# Patient Record
Sex: FEMALE | Race: WHITE | NOT HISPANIC OR LATINO | URBAN - METROPOLITAN AREA
[De-identification: names, ages, dates, MRNs, and addresses within clinical notes are randomized per-mention and may not be internally consistent; named-entity substitution may affect disease eponyms.]

---

## 2023-01-30 ENCOUNTER — EMERGENCY (EMERGENCY)
Facility: HOSPITAL | Age: 63
LOS: 1 days | Discharge: HOME | End: 2023-01-30
Attending: EMERGENCY MEDICINE
Payer: COMMERCIAL

## 2023-01-30 VITALS
TEMPERATURE: 98 F | OXYGEN SATURATION: 99 % | HEART RATE: 73 BPM | RESPIRATION RATE: 20 BRPM | SYSTOLIC BLOOD PRESSURE: 185 MMHG | WEIGHT: 132.06 LBS | HEIGHT: 62 IN | DIASTOLIC BLOOD PRESSURE: 88 MMHG

## 2023-01-30 LAB
ALBUMIN SERPL ELPH-MCNC: 4.1 G/DL — SIGNIFICANT CHANGE UP (ref 3.3–5.2)
ALP SERPL-CCNC: 81 U/L — SIGNIFICANT CHANGE UP (ref 40–120)
ALT FLD-CCNC: 19 U/L — SIGNIFICANT CHANGE UP
ANION GAP SERPL CALC-SCNC: 12 MMOL/L — SIGNIFICANT CHANGE UP (ref 5–17)
AST SERPL-CCNC: 22 U/L — SIGNIFICANT CHANGE UP
BASOPHILS # BLD AUTO: 0.07 K/UL — SIGNIFICANT CHANGE UP (ref 0–0.2)
BASOPHILS NFR BLD AUTO: 0.8 % — SIGNIFICANT CHANGE UP (ref 0–2)
BILIRUB SERPL-MCNC: 0.5 MG/DL — SIGNIFICANT CHANGE UP (ref 0.4–2)
BUN SERPL-MCNC: 19.7 MG/DL — SIGNIFICANT CHANGE UP (ref 8–20)
CALCIUM SERPL-MCNC: 9.3 MG/DL — SIGNIFICANT CHANGE UP (ref 8.4–10.5)
CHLORIDE SERPL-SCNC: 101 MMOL/L — SIGNIFICANT CHANGE UP (ref 96–108)
CO2 SERPL-SCNC: 23 MMOL/L — SIGNIFICANT CHANGE UP (ref 22–29)
CREAT SERPL-MCNC: 0.78 MG/DL — SIGNIFICANT CHANGE UP (ref 0.5–1.3)
EGFR: 85 ML/MIN/1.73M2 — SIGNIFICANT CHANGE UP
EOSINOPHIL # BLD AUTO: 0.12 K/UL — SIGNIFICANT CHANGE UP (ref 0–0.5)
EOSINOPHIL NFR BLD AUTO: 1.4 % — SIGNIFICANT CHANGE UP (ref 0–6)
GLUCOSE SERPL-MCNC: 121 MG/DL — HIGH (ref 70–99)
HCT VFR BLD CALC: 41.6 % — SIGNIFICANT CHANGE UP (ref 34.5–45)
HGB BLD-MCNC: 13.5 G/DL — SIGNIFICANT CHANGE UP (ref 11.5–15.5)
IMM GRANULOCYTES NFR BLD AUTO: 0.4 % — SIGNIFICANT CHANGE UP (ref 0–0.9)
LIDOCAIN IGE QN: 69 U/L — HIGH (ref 22–51)
LYMPHOCYTES # BLD AUTO: 2.44 K/UL — SIGNIFICANT CHANGE UP (ref 1–3.3)
LYMPHOCYTES # BLD AUTO: 29 % — SIGNIFICANT CHANGE UP (ref 13–44)
MCHC RBC-ENTMCNC: 28.7 PG — SIGNIFICANT CHANGE UP (ref 27–34)
MCHC RBC-ENTMCNC: 32.5 GM/DL — SIGNIFICANT CHANGE UP (ref 32–36)
MCV RBC AUTO: 88.3 FL — SIGNIFICANT CHANGE UP (ref 80–100)
MONOCYTES # BLD AUTO: 0.44 K/UL — SIGNIFICANT CHANGE UP (ref 0–0.9)
MONOCYTES NFR BLD AUTO: 5.2 % — SIGNIFICANT CHANGE UP (ref 2–14)
NEUTROPHILS # BLD AUTO: 5.3 K/UL — SIGNIFICANT CHANGE UP (ref 1.8–7.4)
NEUTROPHILS NFR BLD AUTO: 63.2 % — SIGNIFICANT CHANGE UP (ref 43–77)
PLATELET # BLD AUTO: 358 K/UL — SIGNIFICANT CHANGE UP (ref 150–400)
POTASSIUM SERPL-MCNC: 4.2 MMOL/L — SIGNIFICANT CHANGE UP (ref 3.5–5.3)
POTASSIUM SERPL-SCNC: 4.2 MMOL/L — SIGNIFICANT CHANGE UP (ref 3.5–5.3)
PROT SERPL-MCNC: 7.7 G/DL — SIGNIFICANT CHANGE UP (ref 6.6–8.7)
RBC # BLD: 4.71 M/UL — SIGNIFICANT CHANGE UP (ref 3.8–5.2)
RBC # FLD: 15.3 % — HIGH (ref 10.3–14.5)
SODIUM SERPL-SCNC: 136 MMOL/L — SIGNIFICANT CHANGE UP (ref 135–145)
WBC # BLD: 8.4 K/UL — SIGNIFICANT CHANGE UP (ref 3.8–10.5)
WBC # FLD AUTO: 8.4 K/UL — SIGNIFICANT CHANGE UP (ref 3.8–10.5)

## 2023-01-30 PROCEDURE — G1004: CPT

## 2023-01-30 PROCEDURE — 80053 COMPREHEN METABOLIC PANEL: CPT

## 2023-01-30 PROCEDURE — 96375 TX/PRO/DX INJ NEW DRUG ADDON: CPT

## 2023-01-30 PROCEDURE — 83690 ASSAY OF LIPASE: CPT

## 2023-01-30 PROCEDURE — 99285 EMERGENCY DEPT VISIT HI MDM: CPT

## 2023-01-30 PROCEDURE — 74177 CT ABD & PELVIS W/CONTRAST: CPT | Mod: 26,MG

## 2023-01-30 PROCEDURE — 96374 THER/PROPH/DIAG INJ IV PUSH: CPT | Mod: XU

## 2023-01-30 PROCEDURE — 96361 HYDRATE IV INFUSION ADD-ON: CPT

## 2023-01-30 PROCEDURE — 36415 COLL VENOUS BLD VENIPUNCTURE: CPT

## 2023-01-30 PROCEDURE — 99284 EMERGENCY DEPT VISIT MOD MDM: CPT | Mod: 25

## 2023-01-30 PROCEDURE — 74177 CT ABD & PELVIS W/CONTRAST: CPT | Mod: MG

## 2023-01-30 PROCEDURE — 85025 COMPLETE CBC W/AUTO DIFF WBC: CPT

## 2023-01-30 RX ORDER — SODIUM CHLORIDE 9 MG/ML
1000 INJECTION INTRAMUSCULAR; INTRAVENOUS; SUBCUTANEOUS ONCE
Refills: 0 | Status: COMPLETED | OUTPATIENT
Start: 2023-01-30 | End: 2023-01-30

## 2023-01-30 RX ORDER — MORPHINE SULFATE 50 MG/1
4 CAPSULE, EXTENDED RELEASE ORAL ONCE
Refills: 0 | Status: DISCONTINUED | OUTPATIENT
Start: 2023-01-30 | End: 2023-01-30

## 2023-01-30 RX ORDER — ONDANSETRON 8 MG/1
4 TABLET, FILM COATED ORAL ONCE
Refills: 0 | Status: COMPLETED | OUTPATIENT
Start: 2023-01-30 | End: 2023-01-30

## 2023-01-30 RX ORDER — OXYCODONE AND ACETAMINOPHEN 5; 325 MG/1; MG/1
1 TABLET ORAL
Qty: 9 | Refills: 0
Start: 2023-01-30 | End: 2023-02-01

## 2023-01-30 RX ADMIN — SODIUM CHLORIDE 1000 MILLILITER(S): 9 INJECTION INTRAMUSCULAR; INTRAVENOUS; SUBCUTANEOUS at 14:21

## 2023-01-30 RX ADMIN — MORPHINE SULFATE 4 MILLIGRAM(S): 50 CAPSULE, EXTENDED RELEASE ORAL at 15:48

## 2023-01-30 RX ADMIN — MORPHINE SULFATE 4 MILLIGRAM(S): 50 CAPSULE, EXTENDED RELEASE ORAL at 14:26

## 2023-01-30 RX ADMIN — SODIUM CHLORIDE 1000 MILLILITER(S): 9 INJECTION INTRAMUSCULAR; INTRAVENOUS; SUBCUTANEOUS at 15:48

## 2023-01-30 RX ADMIN — ONDANSETRON 4 MILLIGRAM(S): 8 TABLET, FILM COATED ORAL at 14:26

## 2023-01-30 NOTE — ED STATDOCS - PHYSICAL EXAMINATION
Gen: No acute distress, non toxic  HEENT: Mucous membranes moist, pink conjunctivae, EOMI  CV: RRR, nl s1/s2.  Resp: CTAB, normal rate and effort  GI: Abdomen soft, ND. No rebound, + RLQ tenderness with voluntary guarding  : No CVAT  Neuro: A&O x 3, moving all 4 extremities  MSK: No spine or joint tenderness to palpation  Skin: No rashes. intact and perfused.

## 2023-01-30 NOTE — ED STATDOCS - OBJECTIVE STATEMENT
61 y/o female with PMHx of  presents to the ED c/o 2 of RLQ pain w/o any associated symptoms, pain no improving with Motrin, no urinary symptoms, no fever, no blood in stool. no other complaints

## 2023-01-30 NOTE — ED STATDOCS - PATIENT PORTAL LINK FT
You can access the FollowMyHealth Patient Portal offered by [facility] by registering at the following website: [email or web address]. By joining [facility]’s FollowMyHealth portal, you will also be able to view your health information using other applications (apps) compatible with our system.

## 2023-01-30 NOTE — ED STATDOCS - NS ED ATTENDING STATEMENT MOD
This was a shared visit with the DIONNE. I reviewed and verified the documentation and independently performed the documented:

## 2023-01-30 NOTE — ED STATDOCS - CARE PROVIDER_API CALL
Carlton Noel)  Gastroenterology; Internal Medicine  [address] [place] [address]  Phone: [phone]  Fax: [phone]  Follow Up Time:

## 2023-01-30 NOTE — ED STATDOCS - NSFOLLOWUPINSTRUCTIONS_ED_ALL_ED_FT
Abdominal Pain    Many things can cause abdominal pain. Many times, abdominal pain is not caused by a disease and will improve without treatment. Your health care provider will do a physical exam to determine if there is a dangerous cause of your pain; blood tests and imaging may help determine the cause of your pain. However, in many cases, no cause may be found and you may need further testing as an outpatient. Monitor your abdominal pain for any changes.     SEEK IMMEDIATE MEDICAL CARE IF YOU HAVE ANY OF THE FOLLOWING SYMPTOMS: worsening abdominal pain, uncontrollable vomiting, profuse diarrhea, inability to have bowel movements or pass gas, black or bloody stools, fever accompanying chest pain or back pain, or fainting. These symptoms may represent a serious problem that is an emergency. Do not wait to see if the symptoms will go away. Get medical help right away. Call 911 and do not drive yourself to the hospital.       Please follow up with your doctor within 24 hours  Please follow up with gastroenterolgy within one week

## 2023-01-30 NOTE — ED STATDOCS - ATTENDING APP SHARED VISIT CONTRIBUTION OF CARE
Kareen: I performed a face to face bedside interview with patient regarding history of present illness, review of symptoms and past medical history. I completed an independent physical exam and ordered tests/medications as needed.  I have discussed patient's plan of care with advanced care provider. The advanced care provider assisted in  executing the discussed plan. I was available for any questions or issues that may have arose during the execution of the plan of care.

## 2023-01-30 NOTE — ED STATDOCS - NS ED ROS FT
ROS: No fever/chills. No eye pain/changes in vision, No ear pain/sore throat/dysphagia, No chest pain/palpitations. No SOB/cough/. + abdominal pain, no N/V/D, no black/bloody bm. No dysuria/frequency/discharge, No headache. No Dizziness.    No rashes or breaks in skin. No numbness/tingling/weakness.

## 2023-01-30 NOTE — ED STATDOCS - CLINICAL SUMMARY MEDICAL DECISION MAKING FREE TEXT BOX
63 y/o female with PMHx of  presents to the ED c/o RLQ pain for 2 days with voluntary guarding and some tenderness will check labs, treat symptoms, obtain CT to evaluate for appendicitis vs ovarian pathology

## 2024-01-18 ENCOUNTER — OFFICE (OUTPATIENT)
Dept: URBAN - METROPOLITAN AREA CLINIC 94 | Facility: CLINIC | Age: 64
Setting detail: OPHTHALMOLOGY
End: 2024-01-18
Payer: COMMERCIAL

## 2024-01-18 DIAGNOSIS — H25.13: ICD-10-CM

## 2024-01-18 DIAGNOSIS — H35.033: ICD-10-CM

## 2024-01-18 DIAGNOSIS — E11.9: ICD-10-CM

## 2024-01-18 DIAGNOSIS — H16.223: ICD-10-CM

## 2024-01-18 PROBLEM — E13.36 DIABETES MELLITUS W/OPHTH MANIFESTATIONS-TYPE 2 ; BOTH EYES: Status: ACTIVE | Noted: 2024-01-18

## 2024-01-18 PROBLEM — H16.222 DRY EYE SYNDROME K SICCA; RIGHT EYE, LEFT EYE, BOTH EYES: Status: ACTIVE | Noted: 2024-01-18

## 2024-01-18 PROBLEM — H16.221 DRY EYE SYNDROME K SICCA; RIGHT EYE, LEFT EYE, BOTH EYES: Status: ACTIVE | Noted: 2024-01-18

## 2024-01-18 PROCEDURE — 92250 FUNDUS PHOTOGRAPHY W/I&R: CPT | Performed by: OPHTHALMOLOGY

## 2024-01-18 PROCEDURE — 99204 OFFICE O/P NEW MOD 45 MIN: CPT | Performed by: OPHTHALMOLOGY

## 2024-01-18 ASSESSMENT — REFRACTION_MANIFEST
OD_VA1: 20/20
OS_VA1: 20/20
OS_AXIS: 126
OS_CYLINDER: -0.25
OD_SPHERE: +2.50
OS_SPHERE: +2.50

## 2024-01-18 ASSESSMENT — REFRACTION_CURRENTRX
OS_SPHERE: +2.50
OD_OVR_VA: 20/
OS_OVR_VA: 20/
OD_AXIS: 173
OS_AXIS: 125
OD_SPHERE: +2.25
OD_CYLINDER: -0.25
OS_CYLINDER: -0.50

## 2024-01-18 ASSESSMENT — REFRACTION_AUTOREFRACTION
OS_CYLINDER: -0.50
OS_SPHERE: +1.50
OD_SPHERE: +1.00
OD_CYLINDER: -0.50
OD_AXIS: 065
OS_AXIS: 085

## 2024-01-18 ASSESSMENT — SPHEQUIV_DERIVED
OD_SPHEQUIV: 0.75
OS_SPHEQUIV: 2.375
OS_SPHEQUIV: 1.25

## 2024-01-18 ASSESSMENT — SUPERFICIAL PUNCTATE KERATITIS (SPK)
OS_SPK: 1+
OD_SPK: 1+

## 2024-01-18 ASSESSMENT — CONFRONTATIONAL VISUAL FIELD TEST (CVF)
OS_FINDINGS: FULL
OD_FINDINGS: FULL

## 2024-02-23 ENCOUNTER — OFFICE (OUTPATIENT)
Dept: URBAN - METROPOLITAN AREA CLINIC 94 | Facility: CLINIC | Age: 64
Setting detail: OPHTHALMOLOGY
End: 2024-02-23
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
## 2024-02-23 PROCEDURE — NO SHOW FE NO SHOW FEE: Performed by: OPHTHALMOLOGY

## 2025-07-05 ENCOUNTER — INPATIENT (INPATIENT)
Facility: HOSPITAL | Age: 65
LOS: 0 days | Discharge: HOME | DRG: 84 | End: 2025-07-06
Attending: STUDENT IN AN ORGANIZED HEALTH CARE EDUCATION/TRAINING PROGRAM | Admitting: STUDENT IN AN ORGANIZED HEALTH CARE EDUCATION/TRAINING PROGRAM
Payer: MEDICARE

## 2025-07-05 VITALS
RESPIRATION RATE: 18 BRPM | WEIGHT: 126.99 LBS | OXYGEN SATURATION: 97 % | TEMPERATURE: 98 F | SYSTOLIC BLOOD PRESSURE: 181 MMHG | HEIGHT: 62 IN | HEART RATE: 88 BPM | DIASTOLIC BLOOD PRESSURE: 101 MMHG

## 2025-07-05 PROCEDURE — 70450 CT HEAD/BRAIN W/O DYE: CPT | Mod: 26

## 2025-07-05 PROCEDURE — 99291 CRITICAL CARE FIRST HOUR: CPT

## 2025-07-05 PROCEDURE — 72125 CT NECK SPINE W/O DYE: CPT | Mod: 26

## 2025-07-05 RX ORDER — ACETAMINOPHEN 500 MG/5ML
650 LIQUID (ML) ORAL ONCE
Refills: 0 | Status: COMPLETED | OUTPATIENT
Start: 2025-07-05 | End: 2025-07-05

## 2025-07-05 RX ADMIN — Medication 650 MILLIGRAM(S): at 20:14

## 2025-07-05 NOTE — ED PROVIDER NOTE - PROGRESS NOTE DETAILS
received signout from ALESSANDRO Jiménez pending CT. pt neuro intact received call from radiologist. pt moved to critical care at this time. neurosx consulted trauma consulted for trauma eval. Victor Manuel: Pt to be admitted to SDU under trauma service.

## 2025-07-05 NOTE — ED PROVIDER NOTE - CLINICAL SUMMARY MEDICAL DECISION MAKING FREE TEXT BOX
64-year-old female presents the ED complaining of head injury status post fall.  Patient explained that while gardening today she was pulling something out of the ground and lost her balance fell backward and hit her head.  Patient denies any loss of consciousness, visual changes, nausea, vomiting or neck pain.    HEENT: Normal findings, Eyes : PERRLA, EOMI , Nares clear and Throat : WNL  Lungs: Clear B/L with good air entry  CVS: S1-S2 , with no murmurs  Abd : Normal BS, with no tenderness or organomegaly  Ext: Normal findings  Acetaminophen, CT head and CT neck

## 2025-07-05 NOTE — ED ADULT TRIAGE NOTE - CHIEF COMPLAINT QUOTE
pt states she was doing work in the yard & fell back hitting back of the head, denies LOC, no vomiting  A&Ox3, resp wnl, denies blood thinners, no lump noted

## 2025-07-05 NOTE — ED PROVIDER NOTE - CRITICAL CARE ATTENDING CONTRIBUTION TO CARE
I, Harsh Vallejo MD, spent 35 minutes of critical care time with this patient. This does not include time spent on separately reported billable procedures.    63 year old female c/o headache after mechanical fall. PE unremarkable. labs and diagnostic imaging results reviewed with patient. neurosurgery assessment noted. admit

## 2025-07-05 NOTE — ED ADULT NURSE NOTE - OBJECTIVE STATEMENT
patient  presents to the ER for head injury after she fell while working on yard, pt states that she fell back wards and hit her head. pt denies loc, neck pain or blood thinners. pt complaining of headache

## 2025-07-05 NOTE — CONSULT NOTE ADULT - ASSESSMENT
64yF PMH HTN, DM, was gardening earlier today when she lost her balance and fell backwards landing on dirt without loss of consciousness  - CTH with acute 4 mm L anterior parafalcine SDH    PLAN:  - D/w Dr. Cardoza  - Trauma evaluation  - Q2 neuro checks  - CTH 6 hours (~ 4 AM)  - Pain control PRN, avoid oversedation to ensure accurate neurologic exams  - Normotensive BP goals SBP < 160  - Supportive care/further medical management per trauma/ED  - Further recommendations if any pending repeat CTH

## 2025-07-05 NOTE — ED PROVIDER NOTE - OBJECTIVE STATEMENT
64-year-old female presents the ED complaining of head injury status post fall.  Patient explained that while gardening today she was pulling something out of the ground and lost her balance fell backward and hit her head.  Patient denies any loss of consciousness, visual changes, nausea, vomiting or neck pain.  Patient was able to get up was able to drive herself to the ED and is requesting for evaluation to make sure she does not have any issues.  Patient denies any blood thinners.  Patient denies any other issue at this time.

## 2025-07-05 NOTE — CONSULT NOTE ADULT - SUBJECTIVE AND OBJECTIVE BOX
HISTORY OF PRESENT ILLNESS:   64yF PMH HTN, DM, was gardening earlier today when she lost her balance and fell backwards landing on dirt without loss of consciousness. Due to mild to moderate headaches she sought medical attention to get checked up. CTH with 4 mm left anterior parafalcine SDH. Reports headache 5/10 in nature. Denies dizziness, n/v, chest pain, palpitations, SOB, abdominal pain, weakness, numbness, tingling. Does not take any anticoagulation or antiplatelet therapy    PAST MEDICAL & SURGICAL HISTORY:  HTN  DM  Hysterectomy    SOCIAL HISTORY:  Tobacco Use: Denies  EtOH use: Denies  Substance: Denies    Allergies  sulfa drugs (Rash)  penicillin (Anaphylaxis)    REVIEW OF SYSTEMS  As noted in HPI    MEDICATIONS:  Antibiotics:    Neuro:    Anticoagulation:    OTHER:    IVF:    Vital Signs Last 24 Hrs  T(C): 36.6 (05 Jul 2025 23:19), Max: 36.6 (05 Jul 2025 19:03)  T(F): 97.9 (05 Jul 2025 23:19), Max: 97.9 (05 Jul 2025 19:03)  HR: 72 (05 Jul 2025 23:19) (72 - 88)  BP: 175/95 (05 Jul 2025 23:19) (175/95 - 181/101)  BP(mean): 122 (05 Jul 2025 23:19) (122 - 122)  RR: 18 (05 Jul 2025 23:19) (18 - 18)  SpO2: 100% (05 Jul 2025 23:19) (97% - 100%)    Parameters below as of 05 Jul 2025 23:19  Patient On (Oxygen Delivery Method): room air    PHYSICAL EXAM:  GENERAL: NAD, well-groomed  HEAD: Atraumatic, normocephalic  JONA COMA SCORE: E- 4 V- 5 M- 6=15  MENTAL STATUS: AAO x3; Appropriately conversant without aphasia; following commands  CRANIAL NERVES: PERRL. EOMI without nystagmus. Facial sensation intact V1-3 distribution b/l. Face symmetric w/ normal eye closure and smile, tongue midline. Hearing grossly intact. Speech clear  MOTOR: strength 5/5 b/l upper and lower extremities  SENSATION: grossly intact to light touch all extremities  CHEST/LUNG: Nonlabored breathing  ABDOMEN: Soft, nontender    RADIOLOGY & ADDITIONAL STUDIES:  CT Head/Cervical Spine No Cont (07.05.25 @ 21:56)  IMPRESSION:  CT HEAD:  Left anterior parafalcine acute subdural hematoma measures 4 mm. No   midline shift.    CT CERVICAL SPINE:  No acute fracture or traumatic subluxation.  Multi-level degenerative changes.

## 2025-07-06 VITALS
OXYGEN SATURATION: 93 % | TEMPERATURE: 98 F | HEART RATE: 75 BPM | DIASTOLIC BLOOD PRESSURE: 79 MMHG | RESPIRATION RATE: 16 BRPM | SYSTOLIC BLOOD PRESSURE: 164 MMHG

## 2025-07-06 DIAGNOSIS — S06.5XAA TRAUMATIC SUBDURAL HEMORRHAGE WITH LOSS OF CONSCIOUSNESS STATUS UNKNOWN, INITIAL ENCOUNTER: ICD-10-CM

## 2025-07-06 LAB
A1C WITH ESTIMATED AVERAGE GLUCOSE RESULT: 6 % — HIGH (ref 4–5.6)
ALBUMIN SERPL ELPH-MCNC: 4.2 G/DL — SIGNIFICANT CHANGE UP (ref 3.3–5.2)
ALP SERPL-CCNC: 85 U/L — SIGNIFICANT CHANGE UP (ref 40–120)
ALT FLD-CCNC: 20 U/L — SIGNIFICANT CHANGE UP
ANION GAP SERPL CALC-SCNC: 13 MMOL/L — SIGNIFICANT CHANGE UP (ref 5–17)
APTT BLD: 34.4 SEC — SIGNIFICANT CHANGE UP (ref 26.1–36.8)
AST SERPL-CCNC: 25 U/L — SIGNIFICANT CHANGE UP
BASOPHILS # BLD AUTO: 0.08 K/UL — SIGNIFICANT CHANGE UP (ref 0–0.2)
BASOPHILS NFR BLD AUTO: 0.9 % — SIGNIFICANT CHANGE UP (ref 0–2)
BILIRUB SERPL-MCNC: 0.3 MG/DL — LOW (ref 0.4–2)
BLD GP AB SCN SERPL QL: SIGNIFICANT CHANGE UP
BUN SERPL-MCNC: 19.8 MG/DL — SIGNIFICANT CHANGE UP (ref 8–20)
CALCIUM SERPL-MCNC: 9.5 MG/DL — SIGNIFICANT CHANGE UP (ref 8.4–10.5)
CHLORIDE SERPL-SCNC: 102 MMOL/L — SIGNIFICANT CHANGE UP (ref 96–108)
CO2 SERPL-SCNC: 22 MMOL/L — SIGNIFICANT CHANGE UP (ref 22–29)
CREAT SERPL-MCNC: 0.75 MG/DL — SIGNIFICANT CHANGE UP (ref 0.5–1.3)
EGFR: 88 ML/MIN/1.73M2 — SIGNIFICANT CHANGE UP
EGFR: 88 ML/MIN/1.73M2 — SIGNIFICANT CHANGE UP
EOSINOPHIL # BLD AUTO: 0.06 K/UL — SIGNIFICANT CHANGE UP (ref 0–0.5)
EOSINOPHIL NFR BLD AUTO: 0.7 % — SIGNIFICANT CHANGE UP (ref 0–6)
ESTIMATED AVERAGE GLUCOSE: 126 MG/DL — HIGH (ref 68–114)
GLUCOSE BLDC GLUCOMTR-MCNC: 114 MG/DL — HIGH (ref 70–99)
GLUCOSE BLDC GLUCOMTR-MCNC: 117 MG/DL — HIGH (ref 70–99)
GLUCOSE SERPL-MCNC: 101 MG/DL — HIGH (ref 70–99)
HCT VFR BLD CALC: 38 % — SIGNIFICANT CHANGE UP (ref 34.5–45)
HGB BLD-MCNC: 11.9 G/DL — SIGNIFICANT CHANGE UP (ref 11.5–15.5)
IMM GRANULOCYTES # BLD AUTO: 0.02 K/UL — SIGNIFICANT CHANGE UP (ref 0–0.07)
IMM GRANULOCYTES NFR BLD AUTO: 0.2 % — SIGNIFICANT CHANGE UP (ref 0–0.9)
INR BLD: 0.97 RATIO — SIGNIFICANT CHANGE UP (ref 0.85–1.16)
LYMPHOCYTES # BLD AUTO: 2.94 K/UL — SIGNIFICANT CHANGE UP (ref 1–3.3)
LYMPHOCYTES NFR BLD AUTO: 32.3 % — SIGNIFICANT CHANGE UP (ref 13–44)
MCHC RBC-ENTMCNC: 26.8 PG — LOW (ref 27–34)
MCHC RBC-ENTMCNC: 31.3 G/DL — LOW (ref 32–36)
MCV RBC AUTO: 85.6 FL — SIGNIFICANT CHANGE UP (ref 80–100)
MONOCYTES # BLD AUTO: 0.6 K/UL — SIGNIFICANT CHANGE UP (ref 0–0.9)
MONOCYTES NFR BLD AUTO: 6.6 % — SIGNIFICANT CHANGE UP (ref 2–14)
NEUTROPHILS # BLD AUTO: 5.41 K/UL — SIGNIFICANT CHANGE UP (ref 1.8–7.4)
NEUTROPHILS NFR BLD AUTO: 59.3 % — SIGNIFICANT CHANGE UP (ref 43–77)
NRBC # BLD AUTO: 0 K/UL — SIGNIFICANT CHANGE UP (ref 0–0)
NRBC # FLD: 0 K/UL — SIGNIFICANT CHANGE UP (ref 0–0)
NRBC BLD AUTO-RTO: 0 /100 WBCS — SIGNIFICANT CHANGE UP (ref 0–0)
PLATELET # BLD AUTO: 329 K/UL — SIGNIFICANT CHANGE UP (ref 150–400)
PMV BLD: 10.2 FL — SIGNIFICANT CHANGE UP (ref 7–13)
POTASSIUM SERPL-MCNC: 4 MMOL/L — SIGNIFICANT CHANGE UP (ref 3.5–5.3)
POTASSIUM SERPL-SCNC: 4 MMOL/L — SIGNIFICANT CHANGE UP (ref 3.5–5.3)
PROT SERPL-MCNC: 7.2 G/DL — SIGNIFICANT CHANGE UP (ref 6.6–8.7)
PROTHROM AB SERPL-ACNC: 11 SEC — SIGNIFICANT CHANGE UP (ref 9.9–13.4)
RBC # BLD: 4.44 M/UL — SIGNIFICANT CHANGE UP (ref 3.8–5.2)
RBC # FLD: 15.4 % — HIGH (ref 10.3–14.5)
SODIUM SERPL-SCNC: 137 MMOL/L — SIGNIFICANT CHANGE UP (ref 135–145)
WBC # BLD: 9.11 K/UL — SIGNIFICANT CHANGE UP (ref 3.8–10.5)
WBC # FLD AUTO: 9.11 K/UL — SIGNIFICANT CHANGE UP (ref 3.8–10.5)

## 2025-07-06 PROCEDURE — 73560 X-RAY EXAM OF KNEE 1 OR 2: CPT | Mod: 26,RT

## 2025-07-06 PROCEDURE — 99291 CRITICAL CARE FIRST HOUR: CPT

## 2025-07-06 PROCEDURE — 86850 RBC ANTIBODY SCREEN: CPT

## 2025-07-06 PROCEDURE — 85610 PROTHROMBIN TIME: CPT

## 2025-07-06 PROCEDURE — 85025 COMPLETE CBC W/AUTO DIFF WBC: CPT

## 2025-07-06 PROCEDURE — 70450 CT HEAD/BRAIN W/O DYE: CPT

## 2025-07-06 PROCEDURE — 86900 BLOOD TYPING SEROLOGIC ABO: CPT

## 2025-07-06 PROCEDURE — 82962 GLUCOSE BLOOD TEST: CPT

## 2025-07-06 PROCEDURE — 80053 COMPREHEN METABOLIC PANEL: CPT

## 2025-07-06 PROCEDURE — 85730 THROMBOPLASTIN TIME PARTIAL: CPT

## 2025-07-06 PROCEDURE — 72125 CT NECK SPINE W/O DYE: CPT

## 2025-07-06 PROCEDURE — 83036 HEMOGLOBIN GLYCOSYLATED A1C: CPT

## 2025-07-06 PROCEDURE — 36415 COLL VENOUS BLD VENIPUNCTURE: CPT

## 2025-07-06 PROCEDURE — 73560 X-RAY EXAM OF KNEE 1 OR 2: CPT

## 2025-07-06 PROCEDURE — 86901 BLOOD TYPING SEROLOGIC RH(D): CPT

## 2025-07-06 PROCEDURE — 70450 CT HEAD/BRAIN W/O DYE: CPT | Mod: 26

## 2025-07-06 PROCEDURE — 99284 EMERGENCY DEPT VISIT MOD MDM: CPT

## 2025-07-06 RX ORDER — DILTIAZEM HYDROCHLORIDE 240 MG/1
1 TABLET, EXTENDED RELEASE ORAL
Refills: 0 | DISCHARGE

## 2025-07-06 RX ORDER — METFORMIN HYDROCHLORIDE 850 MG/1
1 TABLET ORAL
Refills: 0 | DISCHARGE

## 2025-07-06 RX ORDER — ESCITALOPRAM OXALATE 20 MG/1
1 TABLET ORAL
Refills: 0 | DISCHARGE

## 2025-07-06 RX ORDER — IRBESARTAN 75 MG/1
1 TABLET ORAL
Refills: 0 | DISCHARGE

## 2025-07-06 RX ORDER — EMPAGLIFLOZIN 25 MG/1
1 TABLET, FILM COATED ORAL
Refills: 0 | DISCHARGE

## 2025-07-06 RX ORDER — ESCITALOPRAM OXALATE 20 MG/1
10 TABLET ORAL DAILY
Refills: 0 | Status: DISCONTINUED | OUTPATIENT
Start: 2025-07-06 | End: 2025-07-06

## 2025-07-06 RX ORDER — INSULIN LISPRO 100 U/ML
INJECTION, SOLUTION INTRAVENOUS; SUBCUTANEOUS EVERY 6 HOURS
Refills: 0 | Status: DISCONTINUED | OUTPATIENT
Start: 2025-07-06 | End: 2025-07-06

## 2025-07-06 RX ORDER — PREGABALIN 50 MG/1
1 CAPSULE ORAL
Refills: 0 | DISCHARGE

## 2025-07-06 RX ORDER — LOSARTAN POTASSIUM 100 MG/1
100 TABLET, FILM COATED ORAL DAILY
Refills: 0 | Status: DISCONTINUED | OUTPATIENT
Start: 2025-07-06 | End: 2025-07-06

## 2025-07-06 RX ORDER — METOPROLOL SUCCINATE 50 MG/1
100 TABLET, EXTENDED RELEASE ORAL EVERY 12 HOURS
Refills: 0 | Status: DISCONTINUED | OUTPATIENT
Start: 2025-07-06 | End: 2025-07-06

## 2025-07-06 RX ORDER — ACETAMINOPHEN 500 MG/5ML
975 LIQUID (ML) ORAL EVERY 6 HOURS
Refills: 0 | Status: DISCONTINUED | OUTPATIENT
Start: 2025-07-06 | End: 2025-07-06

## 2025-07-06 RX ORDER — SEMAGLUTIDE 1 MG/.5ML
1 INJECTION, SOLUTION SUBCUTANEOUS
Refills: 0 | DISCHARGE

## 2025-07-06 RX ORDER — METOPROLOL SUCCINATE 50 MG/1
1 TABLET, EXTENDED RELEASE ORAL
Refills: 0 | DISCHARGE

## 2025-07-06 RX ADMIN — Medication 40 MILLIGRAM(S): at 06:19

## 2025-07-06 RX ADMIN — LOSARTAN POTASSIUM 100 MILLIGRAM(S): 100 TABLET, FILM COATED ORAL at 06:19

## 2025-07-06 RX ADMIN — METOPROLOL SUCCINATE 100 MILLIGRAM(S): 50 TABLET, EXTENDED RELEASE ORAL at 06:19

## 2025-07-06 RX ADMIN — Medication 975 MILLIGRAM(S): at 08:47

## 2025-07-06 RX ADMIN — Medication 1 APPLICATION(S): at 01:00

## 2025-07-06 NOTE — ED ADULT NURSE REASSESSMENT NOTE - NS ED NURSE REASSESS COMMENT FT1
Gave report to Nidia RN, pt medicated with prn pain med prior to transfer, pt VSS, made trauma PA aware pt blood pressure was trending up, PA was okay with bp states pt likely being d/c, pt had telebox in place a&ox4, no distress noted.

## 2025-07-06 NOTE — DISCHARGE NOTE PROVIDER - HOSPITAL COURSE
HPI:  A 64 F with PMH HTN, DM, uterine cancer s/p hysterectomy, who presents after a mechanical fall at home. She notes she was gardening and lost balance while using her shovel, landing backwards on her head. Patient states driving her self to the hospital. Denies any LOC, but did hit her head. Is not on any AC.  (06 Jul 2025 00:39)     Patient was admitted to the SICU for SDH. Repeat head CT stable. patient has no other traumatic injuries. Neurosurgery    HPI:  A 64 F with PMH HTN, DM, uterine cancer s/p hysterectomy, who presents after a mechanical fall at home. She notes she was gardening and lost balance while using her shovel, landing backwards on her head. Patient states driving her self to the hospital. Denies any LOC, but did hit her head. Is not on any AC.  (06 Jul 2025 00:39)     Patient was admitted to the SICU for SDH. Repeat head CT stable. patient has no other traumatic injuries. Neurosurgery was consulted. CTH with 4 mm left anterior parafalcine SDH. Reports headache 5/10 in nature. Denies dizziness, n/v, chest pain, palpitations, SOB, abdominal pain, weakness, numbness, tingling. Does not take any anticoagulation or antiplatelet therapy. All imaging reviewed by attending. Repeat 6hr CTH read as stable. No acute neurosurgical intervention needed at this time. Neurosurgery follow up with Dr. Cardoza in 2 weeks outpatient. Patient states feeling better, tolerating regular diet, voiding spontaneously, ambulating at baseline and pain well controlled. Patient is safe for DC home today.

## 2025-07-06 NOTE — CHART NOTE - NSCHARTNOTEFT_GEN_A_CORE
Tertiary Trauma Survey (TTS)    Date of TTS: 07-06-25 @ 05:45                             Admit Date: 07-06-25 @ 02:00      Trauma Activation: Consult    List Injuries Identified to Date:  L anterior parafalcine SDH       List Operative and Interventional Radiological Procedures:   N/A    Physical Exam:    Neuro: A and Ox3, NAD, Nonfocal. Upper and Lower Extremities Sensory/Motor intact B/L.    HEENT: Normal cephalic, atraumatic. Trachea midline.     Pulm/Chest: CTA B/L, equal rise and fall of the chest.    Cardiac: NSR, S1/S2.    GI / Abdomen: Nontender, nondistended.     Musculoskeletal / Extremities: Atraumatic. AROM of the extremities. Cervical/Thoracic/Lumbar spine NTTP with no obvious stepoffs.     Integumentary: L knee abrasion      RADIOLOGICAL FINDINGS REVIEW:  < from: CT Head No Cont (07.05.25 @ 21:56) >    IMPRESSION:    CT HEAD:  Left anterior parafalcine acute subdural hematoma measures 4 mm. No   midline shift.    CT CERVICAL SPINE:  No acute fracture or traumatic subluxation.    Multi-level degenerative changes.    < end of copied text >          INCIDENTAL FINDINGS:    [x] No    [x] Tertiary exam done, new injuries identified are:  R knee TTP, will obtain XR

## 2025-07-06 NOTE — DISCHARGE NOTE PROVIDER - NSDCCPCAREPLAN_GEN_ALL_CORE_FT
PRINCIPAL DISCHARGE DIAGNOSIS  Diagnosis: Traumatic subdural hematoma  Assessment and Plan of Treatment: BATHING: Please do not submerge wound underwater. You may shower and/or sponge bathe.   ACTIVITY: No heavy lifting or straining. Otherwise, you may return to your usual level of physical activity. If you are taking narcotic pain medication (such as Percocet) DO NOT drive a car, operate machinery or make important decisions.  DIET: Patient is advised to RETURN TO THE EMERGENCY DEPARTMENT for any of the following - worsening pain, fever/chills, nausea/vomiting, altered mental status, chest pain, shortness of breath, or any other new / worsening symptom. to your usual diet.  NOTIFY YOUR SURGEON IF: You have any bleeding that does not stop, any pus draining from your wound(s), any fever (over 100.4 F) or chills, persistent nausea/vomiting, persistent diarrhea, or if your pain is not controlled on your discharge pain medications.  FOLLOW-UP: Please follow up with your primary care physician and Acute Care Surgery clinic [phone] in 10-14 days regarding your hospitalization. Call for appointment upon discharge.

## 2025-07-06 NOTE — PROGRESS NOTE ADULT - NSPROGADDITIONALINFOA_GEN_ALL_CORE
NSGY Attg:    see above    patient seen and examined by PA staff    repeat CT stable    agree with exam and plan as above

## 2025-07-06 NOTE — GOALS OF CARE CONVERSATION - ADVANCED CARE PLANNING - CONVERSATION DETAILS
Discussed diagnosis, treatment plan, prognosis with patient. Patient entered into goals of care conversation voluntarily. Conversation was had face to face.     Patient states that they would like there son named Nighat [phone], to make decisions for them if the patient was unable to make decisions for themself.  Discussed/defined DNR/DNI w/patient/family, including explaining the use of medications/electricity to restart the heart and the use of ETT/mechanical ventilation.  Patient/family is clear that they WOULD want chest compressions, shocks or medications to restart the heart should it stop beating or he have a rhythm not compatible with life.  Patient/family is also clear that they WOULD want to be intubated and put on a ventilator should they require it.     Patient to be: [x] FULL CODE    Total time spent have goals of care conversation and completing appropriate documentation approx. 20 minutes.

## 2025-07-06 NOTE — DISCHARGE NOTE PROVIDER - NSDCMRMEDTOKEN_GEN_ALL_CORE_FT
DilTIAZem (Eqv-Cardizem CD) 120 mg/24 hours oral capsule, extended release: 1 cap(s) orally once a day  escitalopram 10 mg oral tablet: 1 tab(s) orally once a day  irbesartan 300 mg oral tablet: 1 tab(s) orally once a day  Jardiance 25 mg oral tablet: 1 tab(s) orally  MetFORMIN (Eqv-Fortamet) 500 mg oral tablet, extended release: 1 tab(s) orally  metoprolol succinate 200 mg oral capsule, extended release: 1 cap(s) orally once a day  pantoprazole 40 mg oral delayed release tablet: 1 tab(s) orally once a day  pregabalin 100 mg oral capsule: 1 cap(s) orally 2 times a day  semaglutide 14 mg oral tablet: 1 tab(s) orally

## 2025-07-06 NOTE — DISCHARGE NOTE NURSING/CASE MANAGEMENT/SOCIAL WORK - FINANCIAL ASSISTANCE
[facility] provides services at a reduced cost to those who are determined to be eligible through [facility]’s financial assistance program. Information regarding [facility]’s financial assistance program can be found by going to [email or web address] or by calling [phone].

## 2025-07-06 NOTE — H&P ADULT - HISTORY OF PRESENT ILLNESS
A 64 F with PMH HTN, DM, uterine cancer s/p hysterectomy, who presents after a mechanical fall at home. She notes she was gardening and lost balance while using her shovel, landing backwards on her head. Denies any LOC, but did hit her head. Is not on any AC.

## 2025-07-06 NOTE — PROGRESS NOTE ADULT - SUBJECTIVE AND OBJECTIVE BOX
HPI:  A 64 F with PMH HTN, DM, uterine cancer s/p hysterectomy, who presents after a mechanical fall at home. She notes she was gardening and lost balance while using her shovel, landing backwards on her head. Denies any LOC, but did hit her head. Is not on any AC. (06 Jul 2025 00:39)    INTERVAL HPI/OVERNIGHT EVENTS:  64y Female s/p __ seen lying comfortably in bed. Tolerating diet. Passing gas/BM. Voiding. Weaver in with __ clear urine. Denies headache, weakness, numbness, n/v/d, fevers, chills, chest pain, SOB.     Vital Signs Last 24 Hrs  T(C): 36.8 (06 Jul 2025 01:00), Max: 36.8 (06 Jul 2025 01:00)  T(F): 98.2 (06 Jul 2025 01:00), Max: 98.2 (06 Jul 2025 01:00)  HR: 73 (06 Jul 2025 06:18) (72 - 88)  BP: 137/96 (06 Jul 2025 05:00) (137/96 - 190/82)  BP(mean): 122 (05 Jul 2025 23:19) (122 - 122)  RR: 16 (06 Jul 2025 05:00) (16 - 20)  SpO2: 100% (06 Jul 2025 05:00) (97% - 100%)    Parameters below as of 06 Jul 2025 05:00  Patient On (Oxygen Delivery Method): room air    PHYSICAL EXAM:  GENERAL: NAD, well-groomed  HEAD:  Atraumatic, normocephalic  DRAINS:   WOUND: Dressing clean dry intact  JONA COMA SCORE: E- V- M- =       E: 4= opens eyes spontaneously 3= to voice 2= to noxious 1= no opening       V: 5= oriented 4= confused 3= inappropriate words 2= incomprehensible sounds 1= nonverbal 1T= intubated       M: 6= follows commands 5= localizes 4= withdraws 3= flexor posturing 2= extensor posturing 1= no movement  MENTAL STATUS: AAO x3; Awake/Comatose; Opens eyes spontaneously/to voice/to light touch/to noxious stimuli; Appropriately conversant without aphasia/Nonverbal; following simple commands/mimicking/not following commands  CRANIAL NERVES: Visual acuity normal for age, visual fields full to confrontation, PERRL. EOMI without nystagmus. Facial sensation intact V1-3 distribution b/l. Face symmetric w/ normal eye closure and smile, tongue midline. Hearing grossly intact. Speech clear. Head turning and shoulder shrug intact.   REFLEXES: PERRL. Corneals intact b/l. Gag intact. Cough intact. Oculocephalic reflex intact (Doll's eye). Negative Sidhu's b/l. Negative clonus b/l  MOTOR: strength 5/5 b/l upper and lower extremities  Uppers     Delt (C5/6)     Bicep (C5/6)     Wrist Extend (C6)     Tricep (C7)     HG (C8/T1)  R                     5/5                 5/5                         5/5                           5/5                   5/5  L                      5/5                 5/5                         5/5                           5/5                   5/5  Lowers      HF(L1/L2)     KE (L3)     DF (L4)     EHL (L5)     PF (S1)      R                     5/5              5/5           5/5           5/5            5/5  L                     5/5               5/5          5/5            5/5            5/5  SENSATION: grossly intact to light touch all extremities  COORDINATION: Gait intact; rapid alternating movements intact; heel to shin intact; no upper extremity dysmetria  CHEST/LUNG: Clear to auscultation bilaterally; no rales, rhonchi, wheezing, or rubs  HEART: +S1/+S2; Regular rate and rhythm; no murmurs, rubs, or gallops  ABDOMEN: Soft, nontender, nondistended; bowel sounds present all four quadrants  EXTREMITIES:  2+ peripheral pulses, no clubbing, cyanosis, or edema  SKIN: Warm, dry; no rashes or lesions    LABS:                        11.9   9.11  )-----------( 329      ( 06 Jul 2025 00:31 )             38.0     07-06    137  |  102  |  19.8  ----------------------------<  101[H]  4.0   |  22.0  |  0.75    Ca    9.5      06 Jul 2025 00:31    TPro  7.2  /  Alb  4.2  /  TBili  0.3[L]  /  DBili  x   /  AST  25  /  ALT  20  /  AlkPhos  85  07-06    PT/INR - ( 06 Jul 2025 00:31 )   PT: 11.0 sec;   INR: 0.97 ratio      PTT - ( 06 Jul 2025 00:31 )  PTT:34.4 sec  Urinalysis Basic - ( 06 Jul 2025 00:31 )    Color: x / Appearance: x / SG: x / pH: x  Gluc: 101 mg/dL / Ketone: x  / Bili: x / Urobili: x   Blood: x / Protein: x / Nitrite: x   Leuk Esterase: x / RBC: x / WBC x   Sq Epi: x / Non Sq Epi: x / Bacteria: x    RADIOLOGY & ADDITIONAL TESTS:  < from: CT Head No Cont (07.05.25 @ 21:56) >  IMPRESSION:    CT HEAD:  Left anterior parafalcine acute subdural hematoma measures 4 mm. No   midline shift.    CT CERVICAL SPINE:  No acute fracture or traumatic subluxation.    Multi-level degenerative changes. HPI:  64yF PMH HTN, DM, was gardening earlier today when she lost her balance and fell backwards landing on dirt without loss of consciousness. Due to mild to moderate headaches she sought medical attention to get checked up. CTH with 4 mm left anterior parafalcine SDH. Reports headache 5/10 in nature. Denies dizziness, n/v, chest pain, palpitations, SOB, abdominal pain, weakness, numbness, tingling. Does not take any anticoagulation or antiplatelet therapy.    INTERVAL HPI/OVERNIGHT EVENTS:  64F seen this morning on rounds lying comfortably in bed. Pt c/o mild headache. Repeat 6hr CTH performed. Denies weakness, numbness, n/v.    Vital Signs Last 24 Hrs  T(C): 36.8 (06 Jul 2025 01:00), Max: 36.8 (06 Jul 2025 01:00)  T(F): 98.2 (06 Jul 2025 01:00), Max: 98.2 (06 Jul 2025 01:00)  HR: 73 (06 Jul 2025 06:18) (72 - 88)  BP: 137/96 (06 Jul 2025 05:00) (137/96 - 190/82)  BP(mean): 122 (05 Jul 2025 23:19) (122 - 122)  RR: 16 (06 Jul 2025 05:00) (16 - 20)  SpO2: 100% (06 Jul 2025 05:00) (97% - 100%)    Parameters below as of 06 Jul 2025 05:00  Patient On (Oxygen Delivery Method): room air    PHYSICAL EXAM:  GENERAL: NAD  HEAD: Atraumatic, normocephalic  JONA COMA SCORE: E- 4 V- 5 M- 6=15  MENTAL STATUS: AAO x3; Appropriately conversant without aphasia; following commands  CRANIAL NERVES: PERRL. EOMI without nystagmus. Facial sensation intact V1-3 distribution b/l. Face symmetric w/ normal eye closure and smile, tongue midline. Hearing grossly intact. Speech clear.  MOTOR: strength 5/5 b/l upper and lower extremities  SENSATION: grossly intact to light touch all extremities  CHEST/LUNG: Nonlabored breathing    LABS:                        11.9   9.11  )-----------( 329      ( 06 Jul 2025 00:31 )             38.0     07-06    137  |  102  |  19.8  ----------------------------<  101[H]  4.0   |  22.0  |  0.75    Ca    9.5      06 Jul 2025 00:31    TPro  7.2  /  Alb  4.2  /  TBili  0.3[L]  /  DBili  x   /  AST  25  /  ALT  20  /  AlkPhos  85  07-06    PT/INR - ( 06 Jul 2025 00:31 )   PT: 11.0 sec;   INR: 0.97 ratio      PTT - ( 06 Jul 2025 00:31 )  PTT:34.4 sec  Urinalysis Basic - ( 06 Jul 2025 00:31 )    Color: x / Appearance: x / SG: x / pH: x  Gluc: 101 mg/dL / Ketone: x  / Bili: x / Urobili: x   Blood: x / Protein: x / Nitrite: x   Leuk Esterase: x / RBC: x / WBC x   Sq Epi: x / Non Sq Epi: x / Bacteria: x    RADIOLOGY & ADDITIONAL TESTS:  < from: CT Head No Cont (07.05.25 @ 21:56) >  IMPRESSION:    CT HEAD:  Left anterior parafalcine acute subdural hematoma measures 4 mm. No   midline shift.    CT CERVICAL SPINE:  No acute fracture or traumatic subluxation.    Multi-level degenerative changes. HPI:  64yF PMH HTN, DM, was gardening earlier today when she lost her balance and fell backwards landing on dirt without loss of consciousness. Due to mild to moderate headaches she sought medical attention to get checked up. CTH with 4 mm left anterior parafalcine SDH. Reports headache 5/10 in nature. Denies dizziness, n/v, chest pain, palpitations, SOB, abdominal pain, weakness, numbness, tingling. Does not take any anticoagulation or antiplatelet therapy.    INTERVAL HPI/OVERNIGHT EVENTS:  64F seen this morning on rounds lying comfortably in bed. Pt c/o mild headache. Repeat 6hr CTH performed. Denies weakness, numbness, n/v.    Vital Signs Last 24 Hrs  T(C): 36.8 (06 Jul 2025 01:00), Max: 36.8 (06 Jul 2025 01:00)  T(F): 98.2 (06 Jul 2025 01:00), Max: 98.2 (06 Jul 2025 01:00)  HR: 73 (06 Jul 2025 06:18) (72 - 88)  BP: 137/96 (06 Jul 2025 05:00) (137/96 - 190/82)  BP(mean): 122 (05 Jul 2025 23:19) (122 - 122)  RR: 16 (06 Jul 2025 05:00) (16 - 20)  SpO2: 100% (06 Jul 2025 05:00) (97% - 100%)    Parameters below as of 06 Jul 2025 05:00  Patient On (Oxygen Delivery Method): room air    PHYSICAL EXAM:  GENERAL: NAD  HEAD: Atraumatic, normocephalic  JONA COMA SCORE: E- 4 V- 5 M- 6=15  MENTAL STATUS: AAO x3; Appropriately conversant without aphasia; following commands  CRANIAL NERVES: PERRL. EOMI without nystagmus. Facial sensation intact V1-3 distribution b/l. Face symmetric w/ normal eye closure and smile, tongue midline. Hearing grossly intact. Speech clear.  MOTOR: strength 5/5 b/l upper and lower extremities  SENSATION: grossly intact to light touch all extremities  CHEST/LUNG: Nonlabored breathing    LABS:                        11.9   9.11  )-----------( 329      ( 06 Jul 2025 00:31 )             38.0     07-06    137  |  102  |  19.8  ----------------------------<  101[H]  4.0   |  22.0  |  0.75    Ca    9.5      06 Jul 2025 00:31    TPro  7.2  /  Alb  4.2  /  TBili  0.3[L]  /  DBili  x   /  AST  25  /  ALT  20  /  AlkPhos  85  07-06    PT/INR - ( 06 Jul 2025 00:31 )   PT: 11.0 sec;   INR: 0.97 ratio      PTT - ( 06 Jul 2025 00:31 )  PTT:34.4 sec  Urinalysis Basic - ( 06 Jul 2025 00:31 )    Color: x / Appearance: x / SG: x / pH: x  Gluc: 101 mg/dL / Ketone: x  / Bili: x / Urobili: x   Blood: x / Protein: x / Nitrite: x   Leuk Esterase: x / RBC: x / WBC x   Sq Epi: x / Non Sq Epi: x / Bacteria: x    RADIOLOGY & ADDITIONAL TESTS:  < from: CT Head No Cont (07.06.25 @ 05:15) >  IMPRESSION: Stable follow-up CT exam.    < from: CT Head No Cont (07.05.25 @ 21:56) >  IMPRESSION:    CT HEAD:  Left anterior parafalcine acute subdural hematoma measures 4 mm. No   midline shift.    CT CERVICAL SPINE:  No acute fracture or traumatic subluxation.    Multi-level degenerative changes.

## 2025-07-06 NOTE — H&P ADULT - NSHPLABSRESULTS_GEN_ALL_CORE
11.9   9.11  )-----------( 329      ( 06 Jul 2025 00:31 )             38.0 07-06    137  |  102  |  19.8  ----------------------------<  101[H]  4.0   |  22.0  |  0.75    Ca    9.5      06 Jul 2025 00:31    TPro  7.2  /  Alb  4.2  /  TBili  0.3[L]  /  DBili  x   /  AST  25  /  ALT  20  /  AlkPhos  85  07-06                            11.9   9.11  )-----------( 329      ( 06 Jul 2025 00:31 )             38.0

## 2025-07-06 NOTE — H&P ADULT - NSHPPHYSICALEXAM_GEN_ALL_CORE
Gen: NAD  Resp: normal work of breathing  GI: soft, non tender, non distended abdomen  Extremities: warm, no edema, clubbing or cyanosis; no C/T/L spine tenderness  Neuro: 5/5 bilateral UE and LE strength; pupils 2mm equal and reactive  Vascular: bilateral radial and DP palpable pulses

## 2025-07-06 NOTE — DISCHARGE NOTE PROVIDER - CARE PROVIDER_API CALL
Gianfranco Cardoza  Neurological Surgery  [address] [place] [id number]  Phone: [phone]  Fax: [phone]  Follow Up Time: 2 weeks

## 2025-07-06 NOTE — PROGRESS NOTE ADULT - ASSESSMENT
64yF PMH HTN, DM, was gardening earlier today when she lost her balance and fell backwards landing on dirt without loss of consciousness  - CTH with acute 4 mm L anterior parafalcine SDH    Plan:  - Q2 neuro checks  - Normotensive BP goals SBP < 160  - Repeat 6hr CTH pending official read  - Pain control PRN, avoid oversedation to ensure accurate neurologic exams  - Supportive care/further medical management per trauma  - Discussed with Dr. Cardoza 64yF PMH HTN, DM, was gardening earlier today when she lost her balance and fell backwards landing on dirt without loss of consciousness  - CTH with acute 4 mm L anterior parafalcine SDH    Plan:  - Q2 neuro checks  - SBP <160  - All imaging reviewed by attending  - Repeat 6hr CTH read as stable  - Pain control PRN, avoid oversedation to ensure accurate neurologic exams  - Supportive care/further medical management per trauma  - Discussed with Dr. Cardoza 64yF PMH HTN, DM, was gardening earlier today when she lost her balance and fell backwards landing on dirt without loss of consciousness  - CTH with acute 4 mm L anterior parafalcine SDH    Plan:  - All imaging reviewed by attending  - Repeat 6hr CTH read as stable  - STAT CTH for any change in neuro exam  - Pain control prn, avoid oversedation  - No acute neurosurgical intervention needed at this time  - Neurosurgery signing off, reconsult prn  - Please follow up with Dr. Cardoza in 2 weeks outpatient  - Further medical management per trauma  - Discussed with Dr. Cardoza

## 2025-07-06 NOTE — DISCHARGE NOTE PROVIDER - NSFOLLOWUPCLINICS_GEN_ALL_ED_FT
[facility] Acute Care Surgery  Acute Care Surgery  [address] [place] [id number]  Phone: [phone]  Fax:   Follow Up Time: 2 weeks

## 2025-07-06 NOTE — H&P ADULT - NS ATTEND AMEND GEN_ALL_CORE FT
Patient seen and examined in the ED.  This is a 64-year-old female who presents after falling while gardening.  Patient tripped and fell on a shovel, and fell backwards.  She drove herself to the hospital.  She endorses mild headache but has no other complaints.  CTH shows SDH.  She will require admission, NSx consultation, and repeat CTH.

## 2025-07-06 NOTE — DISCHARGE NOTE NURSING/CASE MANAGEMENT/SOCIAL WORK - PATIENT PORTAL LINK FT
You can access the FollowMyHealth Patient Portal offered by [facility] by registering at the following website: [email or web address]. By joining [employer]’s FollowMyHealth portal, you will also be able to view your health information using other applications (apps) compatible with our system.

## 2025-07-06 NOTE — PATIENT PROFILE ADULT - FALL HARM RISK - HARM RISK INTERVENTIONS

## 2025-07-06 NOTE — H&P ADULT - ASSESSMENT
A 64F with PMH of DM, HTN, prior hysterectomy who presents after a mechanical fall, found to have 4mm subdural hematoma  A 64F with PMH of DM, HTN, prior hysterectomy who presents after a mechanical fall, found to have 4mm subdural hematoma. Will be admitted to step down for frequent neurochecks.    Plan:  - pain and nausea control  - home meds  - Q2 neuro checks  - repeat CTH around 4 AM  - normotensive BP goals  (SBP < 160)  - admit to step down to Dr. Tolliver

## 2025-07-22 ENCOUNTER — APPOINTMENT (OUTPATIENT)
Dept: NEUROSURGERY | Facility: CLINIC | Age: 65
End: 2025-07-22

## 2025-08-20 ENCOUNTER — OFFICE (OUTPATIENT)
Dept: URBAN - METROPOLITAN AREA CLINIC 94 | Facility: CLINIC | Age: 65
Setting detail: OPHTHALMOLOGY
End: 2025-08-20
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
## 2025-08-20 PROCEDURE — NO SHOW FE NO SHOW FEE: Performed by: OPHTHALMOLOGY
